# Patient Record
Sex: FEMALE | Race: WHITE | NOT HISPANIC OR LATINO | URBAN - METROPOLITAN AREA
[De-identification: names, ages, dates, MRNs, and addresses within clinical notes are randomized per-mention and may not be internally consistent; named-entity substitution may affect disease eponyms.]

---

## 2023-10-02 ENCOUNTER — OFFICE VISIT (OUTPATIENT)
Dept: FAMILY MEDICINE | Facility: CLINIC | Age: 23
End: 2023-10-02
Payer: COMMERCIAL

## 2023-10-02 VITALS
HEIGHT: 67 IN | HEART RATE: 51 BPM | WEIGHT: 129 LBS | TEMPERATURE: 98 F | SYSTOLIC BLOOD PRESSURE: 101 MMHG | RESPIRATION RATE: 15 BRPM | DIASTOLIC BLOOD PRESSURE: 68 MMHG | OXYGEN SATURATION: 100 % | BODY MASS INDEX: 20.25 KG/M2

## 2023-10-02 DIAGNOSIS — B96.89 BACTERIAL VAGINOSIS: ICD-10-CM

## 2023-10-02 DIAGNOSIS — Z11.3 SCREENING EXAMINATION FOR VENEREAL DISEASE: Primary | ICD-10-CM

## 2023-10-02 DIAGNOSIS — N76.0 BACTERIAL VAGINOSIS: ICD-10-CM

## 2023-10-02 LAB
CLUE CELLS: PRESENT
TRICHOMONAS, WET PREP: ABNORMAL
WBC'S/HIGH POWER FIELD, WET PREP: ABNORMAL
YEAST, WET PREP: ABNORMAL

## 2023-10-02 PROCEDURE — 87591 N.GONORRHOEAE DNA AMP PROB: CPT | Performed by: NURSE PRACTITIONER

## 2023-10-02 PROCEDURE — 36415 COLL VENOUS BLD VENIPUNCTURE: CPT | Performed by: NURSE PRACTITIONER

## 2023-10-02 PROCEDURE — 87491 CHLMYD TRACH DNA AMP PROBE: CPT | Performed by: NURSE PRACTITIONER

## 2023-10-02 PROCEDURE — 99203 OFFICE O/P NEW LOW 30 MIN: CPT | Performed by: NURSE PRACTITIONER

## 2023-10-02 PROCEDURE — 87389 HIV-1 AG W/HIV-1&-2 AB AG IA: CPT | Performed by: NURSE PRACTITIONER

## 2023-10-02 PROCEDURE — 87210 SMEAR WET MOUNT SALINE/INK: CPT | Performed by: NURSE PRACTITIONER

## 2023-10-02 PROCEDURE — 86706 HEP B SURFACE ANTIBODY: CPT | Performed by: NURSE PRACTITIONER

## 2023-10-02 PROCEDURE — 86803 HEPATITIS C AB TEST: CPT | Performed by: NURSE PRACTITIONER

## 2023-10-02 PROCEDURE — 86780 TREPONEMA PALLIDUM: CPT | Performed by: NURSE PRACTITIONER

## 2023-10-02 RX ORDER — METRONIDAZOLE 7.5 MG/G
1 GEL VAGINAL DAILY
Qty: 25 G | Refills: 0 | Status: SHIPPED | OUTPATIENT
Start: 2023-10-02 | End: 2023-10-07

## 2023-10-02 ASSESSMENT — PAIN SCALES - GENERAL: PAINLEVEL: NO PAIN (0)

## 2023-10-02 NOTE — PROGRESS NOTES
"  Assessment & Plan     Screening examination for venereal disease  Exposure to chlamydia; testing compete sti panel  - Chlamydia trachomatis PCR Urine  - Hepatitis B Surface Antibody [DMF0517]  - Hepatitis C antibody [WRH094]  - Treponema Abs w Reflex to RPR and Titer [HPF5902]  - Neisseria gonorrhoeae PCR Urine [RNW7377]  - HIV Antigen Antibody Combo [JLX3594]  - Wet prep - Clinic Collect      Bacterial vaginosis  Positive results; sent rx to pharmacy of choice  - metroNIDAZOLE (METROGEL) 0.75 % vaginal gel  Dispense: 25 g; Refill: 0                   LIA Llamas CNP  [facility]    Anita Ge is a 23 year old, presenting for the following health issues:  STD (STD and STI screening )        10/2/2023     1:27 PM   Additional Questions   Roomed by Tracy Hadley         10/2/2023     1:28 PM   Patient Reported Additional Medications   Patient reports taking the following new medications Add Birth control into med list       History of Present Illness       Reason for visit:  Had sexual encounter with someone who had chlamydia. Need STI/STD testing.    She eats 0-1 servings of fruits and vegetables daily.She consumes 0 sweetened beverage(s) daily.She exercises with enough effort to increase her heart rate 20 to 29 minutes per day.  She exercises with enough effort to increase her heart rate 3 or less days per week.   She is taking medications regularly.     - recent exposure to chlamydia requesting sti testing; asymptomatic  pap complete 2021                Review of Systems         Objective    /68 (BP Location: Right arm, Patient Position: Sitting, Cuff Size: Adult Regular)   Pulse 51   Temp 98  F (36.7  C) (Temporal)   Resp 15   Ht 1.702 m (5' 7\")   Wt 58.5 kg (129 lb)   LMP 09/25/2023   SpO2 100%   BMI 20.20 kg/m    Body mass index is 20.2 kg/m .  Physical Exam                         "

## 2023-10-02 NOTE — COMMUNITY RESOURCES LIST (ENGLISH)
10/02/2023   [facility] [employer]  N/A  For questions about this resource list or additional care needs, please contact your primary care clinic or care manager.  Phone: [phone]   Email: N/A   Address: [address] [place] [address]   Hours: N/A        Financial Stability       Utility payment assistance  1  [facility] Office - Energy Assistance Distance: 7.11 miles      In-Person   [address] [place] [address]  Language: English, Jordanian, Kinyarwanda  Hours: Mon - Fri 8:00 AM - 4:00 PM  Fees: Free   Phone: (860) 345-8616 Email: info@[employer] Website: http://www.[employer].org     2  [facility] Distance: 7.34 miles      In-Person   [address] [place] [address]  Language: English  Hours: Mon - Fri 8:00 AM - 4:00 PM  Fees: Free, Self Pay   Phone: (541) 692-1993 Email: Venu@[facility].org Website: [email or web address] General [email or web address]/[facility]/[place]          Important Numbers & Websites       Emergency Services   911  City Services   311  Poison Control   (363) 129-4421  Suicide Prevention Lifeline   (385) 558-1553 (TALK)  Child Abuse Hotline   (227) 246-6284 (4-A-Child)  Sexual Assault Hotline   (403) 171-8209 (HOPE)  National Runaway Safeline   (398) 999-7704 (RUNAWAY)  All-Options Talkline   (170) 402-6992  Substance Abuse Referral   (806) 147-1150 (HELP)

## 2023-10-02 NOTE — COMMUNITY RESOURCES LIST (ENGLISH)
10/02/2023   [facility] [employer]  N/A  For questions about this resource list or additional care needs, please contact your primary care clinic or care manager.  Phone: [phone]   Email: N/A   Address: [address] [place] [address]   Hours: N/A        Financial Stability       Utility payment assistance  1  [facility] Office - Energy Assistance Distance: 7.11 miles      In-Person   [address] [place] [address]  Language: English, Lao, Upper sorbian  Hours: Mon - Fri 8:00 AM - 4:00 PM  Fees: Free   Phone: (676) 557-3929 Email: info@[employer] Website: http://www.[facility].org     2  [facility] Distance: 7.34 miles      In-Person   [address] [place] [address]  Language: English  Hours: Mon - Fri 8:00 AM - 4:00 PM  Fees: Free, Self Pay   Phone: (463) 432-6656 Email: Venu@[facility] [employer].org Website: [email or web address] County General [email or web address]/[facility]/[place]          Important Numbers & Websites       Emergency Services   911  City Services   311  Poison Control   (846) 921-7729  Suicide Prevention Lifeline   (250) 687-3738 (TALK)  Child Abuse Hotline   (485) 176-7193 (4-A-Child)  Sexual Assault Hotline   (181) 807-6360 (HOPE)  National Runaway Safeline   (649) 228-9234 (RUNAWAY)  All-Options Talkline   (186) 148-1431  Substance Abuse Referral   (600) 958-9668 (HELP)

## 2023-10-03 ENCOUNTER — MYC MEDICAL ADVICE (OUTPATIENT)
Dept: FAMILY MEDICINE | Facility: CLINIC | Age: 23
End: 2023-10-03
Payer: COMMERCIAL

## 2023-10-03 DIAGNOSIS — B96.89 BACTERIAL VAGINOSIS: Primary | ICD-10-CM

## 2023-10-03 DIAGNOSIS — A74.9 CHLAMYDIA INFECTION: ICD-10-CM

## 2023-10-03 DIAGNOSIS — N76.0 BACTERIAL VAGINOSIS: Primary | ICD-10-CM

## 2023-10-03 LAB
C TRACH DNA SPEC QL NAA+PROBE: POSITIVE
HBV SURFACE AB SERPL IA-ACNC: 799.57 M[IU]/ML
HBV SURFACE AB SERPL IA-ACNC: REACTIVE M[IU]/ML
HCV AB SERPL QL IA: NONREACTIVE
HIV 1+2 AB+HIV1 P24 AG SERPL QL IA: NONREACTIVE
N GONORRHOEA DNA SPEC QL NAA+PROBE: NEGATIVE
T PALLIDUM AB SER QL: NONREACTIVE

## 2023-10-04 NOTE — TELEPHONE ENCOUNTER
Chichi,    I answered her question about hepatitis B.  Patient wondering if you are prescribing oral antibiotics as well as the vaginal gel for her chlamydia.  Please advise.  Thank you.    Kenyetta Kim RN, BSN, PHN  [facility]  [phone]

## 2023-10-07 RX ORDER — METRONIDAZOLE 500 MG/1
500 TABLET ORAL 2 TIMES DAILY
Qty: 14 TABLET | Refills: 0 | Status: SHIPPED | OUTPATIENT
Start: 2023-10-07 | End: 2023-10-14

## 2023-10-07 RX ORDER — DOXYCYCLINE 100 MG/1
100 CAPSULE ORAL 2 TIMES DAILY
Qty: 14 CAPSULE | Refills: 0 | Status: SHIPPED | OUTPATIENT
Start: 2023-10-07 | End: 2023-10-14

## 2023-10-10 ENCOUNTER — MYC MEDICAL ADVICE (OUTPATIENT)
Dept: FAMILY MEDICINE | Facility: CLINIC | Age: 23
End: 2023-10-10
Payer: COMMERCIAL

## 2023-10-10 NOTE — TELEPHONE ENCOUNTER
See RN response to patient's mychart message re:rx for doxycycline    FELICIA RamirezN RN  [facility]

## 2023-10-10 NOTE — TELEPHONE ENCOUNTER
Writer responded to patient via [facility].  Lory Pyle RN  [facility]

## 2023-11-19 ENCOUNTER — HEALTH MAINTENANCE LETTER (OUTPATIENT)
Age: 23
End: 2023-11-19

## 2023-11-20 ENCOUNTER — TELEPHONE (OUTPATIENT)
Dept: FAMILY MEDICINE | Facility: CLINIC | Age: 23
End: 2023-11-20
Payer: COMMERCIAL

## 2023-11-20 DIAGNOSIS — N76.0 BACTERIAL VAGINAL INFECTION: ICD-10-CM

## 2023-11-20 DIAGNOSIS — Z86.19 HISTORY OF CHLAMYDIA INFECTION: Primary | ICD-10-CM

## 2023-11-20 DIAGNOSIS — B96.89 BACTERIAL VAGINAL INFECTION: ICD-10-CM

## 2023-11-20 NOTE — TELEPHONE ENCOUNTER
Order/Referral Request    Who is requesting: patient    Orders being requested: wet prep, std check,     Reason service is needed/diagnosis: Pt wants to make sure her std is gone    When are orders needed by: [email or web address]    Has this been discussed with Provider: Yes    Does patient have a preference on a Group/Provider/Facility? [facility]    Does patient have an appointment scheduled?: No    Where to send orders: Place orders within Epic    Could we send this information to you in [facility] or would you prefer to receive a phone call?:   Patient would prefer a phone call   Okay to leave a detailed message?: Yes at Cell number on file:    Telephone Information:   Mobile [phone]

## 2023-11-21 NOTE — TELEPHONE ENCOUNTER
Chichi-Please review and advise if it is recommended patient retest for clearance of BV and chlamydia?    Thank you!  FELICIA GarsiaN, RN-[facility]

## 2023-11-21 NOTE — TELEPHONE ENCOUNTER
Signed. Please schedule a lab only appointment via [email or web address] or call [phone] for assistance.

## 2023-11-28 ENCOUNTER — LAB (OUTPATIENT)
Dept: LAB | Facility: CLINIC | Age: 23
End: 2023-11-28
Payer: COMMERCIAL

## 2023-11-28 DIAGNOSIS — Z86.19 HISTORY OF CHLAMYDIA INFECTION: ICD-10-CM

## 2023-11-28 DIAGNOSIS — N76.0 BACTERIAL VAGINAL INFECTION: ICD-10-CM

## 2023-11-28 DIAGNOSIS — B96.89 BACTERIAL VAGINAL INFECTION: ICD-10-CM

## 2023-11-28 PROCEDURE — 87491 CHLMYD TRACH DNA AMP PROBE: CPT

## 2023-11-28 PROCEDURE — 87210 SMEAR WET MOUNT SALINE/INK: CPT

## 2023-11-29 ENCOUNTER — TELEPHONE (OUTPATIENT)
Dept: FAMILY MEDICINE | Facility: CLINIC | Age: 23
End: 2023-11-29
Payer: COMMERCIAL

## 2023-11-29 DIAGNOSIS — B96.89 BACTERIAL VAGINOSIS: Primary | ICD-10-CM

## 2023-11-29 DIAGNOSIS — N76.0 BACTERIAL VAGINOSIS: Primary | ICD-10-CM

## 2023-11-29 LAB — C TRACH DNA SPEC QL NAA+PROBE: NEGATIVE

## 2023-11-29 RX ORDER — METRONIDAZOLE 500 MG/1
500 TABLET ORAL 2 TIMES DAILY
Qty: 14 TABLET | Refills: 0 | Status: SHIPPED | OUTPATIENT
Start: 2023-11-29 | End: 2023-12-06

## 2023-11-29 NOTE — TELEPHONE ENCOUNTER
Writer called and left message on patient's voicemail to call back and speak with a triage nurse.    FELICIA KimN RN  [facility]

## 2023-11-29 NOTE — TELEPHONE ENCOUNTER
Oral metronidazole 500 mg BID x7 days ordered.   Repeat wet prep 1 week after completing medication.   Please schedule a lab only appointment   LIA Llamas CNP

## 2023-11-29 NOTE — TELEPHONE ENCOUNTER
Unable to leave message on patient's voicemail  due to voicemail full.     ----- Message from LIA Llamas CNP sent at 11/28/2023  9:45 PM CST -----  BV is still positive. Please confirm she completed oral Flagyl ordered on 10/7/23.   Recommend repeating oral therapy or changing to vaginal cream/suppository nightly if unable to tolerate oral dosing.     Kathy Jean, BSN RN  [facility]

## 2023-11-29 NOTE — TELEPHONE ENCOUNTER
Writer took incoming call back from earlier today. Chart reviewed, it appears two Flagyl medication (oral and vaginal gel) as ordered back in October. Pt states she ended up doing the Vaginal gel in October.     Pt would like to take the oral medication this time.    Patient Pharmacy:  [facility] [id number] [facility] - [place], [place] - [address]     Will route message back to Provider, LIA Llamas CNP to prescribe oral therapy medication.    Britany Ba, BSN, RN   [facility]

## 2023-11-30 NOTE — TELEPHONE ENCOUNTER
Writer called patient and relayed message from Chichi regarding medication. Scheduled lab follow-up appointment.   Patient stated understanding.     MAYUR Kim RN  [facility]

## 2023-12-13 ENCOUNTER — LAB (OUTPATIENT)
Dept: LAB | Facility: CLINIC | Age: 23
End: 2023-12-13
Payer: COMMERCIAL

## 2023-12-13 DIAGNOSIS — N76.0 BACTERIAL VAGINOSIS: ICD-10-CM

## 2023-12-13 DIAGNOSIS — B96.89 BACTERIAL VAGINOSIS: ICD-10-CM

## 2023-12-13 LAB
CLUE CELLS: ABNORMAL
TRICHOMONAS, WET PREP: ABNORMAL
WBC'S/HIGH POWER FIELD, WET PREP: ABNORMAL
YEAST, WET PREP: ABNORMAL

## 2023-12-13 PROCEDURE — 87210 SMEAR WET MOUNT SALINE/INK: CPT

## 2024-08-20 DIAGNOSIS — Z13.6 SCREENING FOR CARDIOVASCULAR CONDITION: Primary | ICD-10-CM

## 2024-08-31 ENCOUNTER — LAB (OUTPATIENT)
Dept: LAB | Facility: CLINIC | Age: 24
End: 2024-08-31
Payer: COMMERCIAL

## 2024-08-31 DIAGNOSIS — Z13.6 SCREENING FOR CARDIOVASCULAR CONDITION: ICD-10-CM

## 2024-08-31 LAB
CHOLEST SERPL-MCNC: 139 MG/DL
FASTING STATUS PATIENT QL REPORTED: YES
FASTING STATUS PATIENT QL REPORTED: YES
GLUCOSE SERPL-MCNC: 85 MG/DL (ref 70–99)
HDLC SERPL-MCNC: 57 MG/DL
LDLC SERPL CALC-MCNC: 64 MG/DL
NONHDLC SERPL-MCNC: 82 MG/DL
TRIGL SERPL-MCNC: 92 MG/DL

## 2024-08-31 PROCEDURE — 82947 ASSAY GLUCOSE BLOOD QUANT: CPT

## 2024-08-31 PROCEDURE — 36415 COLL VENOUS BLD VENIPUNCTURE: CPT

## 2024-08-31 PROCEDURE — 80061 LIPID PANEL: CPT

## 2025-01-23 ENCOUNTER — TELEPHONE (OUTPATIENT)
Dept: FAMILY MEDICINE | Facility: CLINIC | Age: 25
End: 2025-01-23

## 2025-01-23 ENCOUNTER — E-VISIT (OUTPATIENT)
Dept: URGENT CARE | Facility: CLINIC | Age: 25
End: 2025-01-23
Payer: COMMERCIAL

## 2025-01-23 DIAGNOSIS — N89.8 VAGINAL DISCHARGE: Primary | ICD-10-CM

## 2025-01-23 NOTE — TELEPHONE ENCOUNTER
Call received from patient:  Was trying to get tested for BV but first available appt not until March 2025  How can she get lab test ordered?    Writer recommended patient submit E-Visit.    Patient verbalized understanding and in agreement with plan.    FELICIA GarsiaN, RN-BC  [facility]3] Primary Care

## 2025-01-23 NOTE — PATIENT INSTRUCTIONS
Thank you for choosing us for your care. Given your symptoms, I would like you to do a lab-only visit to determine what is causing them.  I have placed the orders.  Please schedule an appointment with the lab right here in [employer], or call [phone].  I will let you know when the results are back and next steps to take.    Schedule a Lab Only appointment here.

## 2025-01-25 ENCOUNTER — LAB (OUTPATIENT)
Dept: LAB | Facility: CLINIC | Age: 25
End: 2025-01-25
Payer: COMMERCIAL

## 2025-01-25 DIAGNOSIS — N89.8 VAGINAL DISCHARGE: ICD-10-CM

## 2025-01-25 PROCEDURE — 87210 SMEAR WET MOUNT SALINE/INK: CPT

## 2025-01-25 PROCEDURE — 87491 CHLMYD TRACH DNA AMP PROBE: CPT

## 2025-01-25 PROCEDURE — 87591 N.GONORRHOEAE DNA AMP PROB: CPT

## 2025-01-27 LAB
C TRACH DNA SPEC QL NAA+PROBE: NEGATIVE
N GONORRHOEA DNA SPEC QL NAA+PROBE: NEGATIVE
SPECIMEN TYPE: NORMAL
SPECIMEN TYPE: NORMAL

## 2025-01-29 ENCOUNTER — MYC MEDICAL ADVICE (OUTPATIENT)
Dept: FAMILY MEDICINE | Facility: CLINIC | Age: 25
End: 2025-01-29

## 2025-01-29 ENCOUNTER — E-VISIT (OUTPATIENT)
Dept: URGENT CARE | Facility: CLINIC | Age: 25
End: 2025-01-29
Payer: COMMERCIAL

## 2025-01-29 DIAGNOSIS — N94.9 VAGINAL DISCOMFORT: Primary | ICD-10-CM

## 2025-01-29 PROCEDURE — 99207 PR NON-BILLABLE SERV PER CHARTING: CPT | Performed by: NURSE PRACTITIONER

## 2025-01-29 NOTE — TELEPHONE ENCOUNTER
Writer replied to patient via [employer].  GRANT Novak BSN, PHN, AMB-BC (she/her)  [facility] RN

## 2025-01-29 NOTE — PATIENT INSTRUCTIONS
Thank you for choosing us for your care. I think an in-clinic or virtual visit would be the best next step based on your symptoms. Please schedule a clinic appointment; you won t be charged for this eVisit.      You can schedule an appointment by clicking here in [employer], or call [phone].    I recommend you see GYN

## 2025-05-10 ENCOUNTER — HEALTH MAINTENANCE LETTER (OUTPATIENT)
Age: 25
End: 2025-05-10